# Patient Record
Sex: FEMALE | Race: WHITE
[De-identification: names, ages, dates, MRNs, and addresses within clinical notes are randomized per-mention and may not be internally consistent; named-entity substitution may affect disease eponyms.]

---

## 2017-01-03 ENCOUNTER — HOSPITAL ENCOUNTER (OUTPATIENT)
Dept: HOSPITAL 45 - C.MAMM | Age: 67
Discharge: HOME | End: 2017-01-03
Attending: OBSTETRICS & GYNECOLOGY
Payer: COMMERCIAL

## 2017-01-03 DIAGNOSIS — Z12.31: Primary | ICD-10-CM

## 2017-01-03 NOTE — MAMMOGRAPHY REPORT
BILATERAL DIGITAL SCREENING MAMMOGRAM WITH CAD: 1/3/2017

CLINICAL HISTORY: Routine screening examination.  





TECHNIQUE: Bilateral CC and MLO views were obtained.  Current study was also evaluated with a Comput
er Aided Detection (CAD) system.  



COMPARISON: Comparison is made to exams dated:  11/19/2015 mammogram, 11/18/2014 mammogram, 11/10/20
10 mammogram, 11/14/2013 mammogram, 11/13/2012 mammogram, and 11/11/2011 mammogram - [facility].   



BREAST COMPOSITION:  The tissue of both breasts is heterogeneously dense, which may obscure small ma
sses.  



FINDINGS: There is a stable 8 mm mass in the medial left breast, unchanged in size dating back to at
 least 09/04/2007, and therefore likely benign.  There are scattered stable benign-appearing calcifi
cations bilaterally.  No new suspicious mass, architectural distortion or cluster of microcalcificat
ions is seen.  



IMPRESSION:  ACR BI-RADS CATEGORY 1: NEGATIVE

There is no mammographic evidence of malignancy. A 1 year screening mammogram is recommended.  The p
atient will receive written notification of the results.  





Approximately 10% of breast cancers are not detected with mammography. A negative mammographic repor
t should not delay biopsy if a clinically suggestive mass is present.



Francoise Orta M.D.          

ay/:1/3/2017 14:29:42  



Imaging Technologist: Viviana DURÁN(ALVARO)(ELLIOTT), [facility]

letter sent: Normal 1/2  

BI-RADS Code: ACR BI-RADS Category 1: Negative

## 2017-02-20 ENCOUNTER — HOSPITAL ENCOUNTER (OUTPATIENT)
Dept: HOSPITAL 45 - C.PAPS | Age: 67
Discharge: HOME | End: 2017-02-20
Attending: OBSTETRICS & GYNECOLOGY
Payer: COMMERCIAL

## 2017-02-20 DIAGNOSIS — Z01.419: Primary | ICD-10-CM

## 2018-01-04 ENCOUNTER — HOSPITAL ENCOUNTER (OUTPATIENT)
Dept: HOSPITAL 45 - C.MAMM | Age: 68
Discharge: HOME | End: 2018-01-04
Attending: OBSTETRICS & GYNECOLOGY
Payer: COMMERCIAL

## 2018-01-04 DIAGNOSIS — Z12.31: Primary | ICD-10-CM

## 2018-01-04 NOTE — MAMMOGRAPHY REPORT
BILATERAL DIGITAL SCREENING MAMMOGRAM TOMOSYNTHESIS WITH CAD: 1/4/2018

CLINICAL HISTORY: Routine screening.  Patient has no complaints.  





TECHNIQUE:  Breast tomosynthesis in addition to standard 2D mammography was performed. Current study 
was also evaluated with a Computer Aided Detection (CAD) system.  



COMPARISON: Comparison is made to exams dated:  1/3/2017 mammogram, 11/19/2015 mammogram, 11/18/2014 
mammogram, 11/14/2013 mammogram, 11/13/2012 mammogram, and 11/11/2011 mammogram - [facility].   



BREAST COMPOSITION:  The tissue of both breasts is heterogeneously dense, which may obscure small mas
ses.  



FINDINGS:  The parenchymal pattern is similar to prior mammograms.  There are scattered and grouped b
enign-appearing round and punctate microcalcifications and mild vascular calcifications in the breast
s.  Stable nodular asymmetry in the medial left breast. No new suspicious mass, architectural distort
ion or cluster of microcalcifications is seen.  



IMPRESSION:  ACR BI-RADS CATEGORY 2: BENIGN

There is no mammographic evidence of malignancy. A 1 year screening mammogram is recommended.  The pa
tient will receive written notification of the results.  





Approximately 10% of breast cancers are not detected with mammography. A negative mammographic report
 should not delay biopsy if a clinically suggestive mass is present.



Francoise Orta M.D.          

ay/:1/4/2018 10:52:20  



Imaging Technologist: Liset DURÁN(ALVARO)(ELLIOTT)([place]), [facility]

letter sent: Normal 1/2  

BI-RADS Code: ACR BI-RADS Category 2: Benign

## 2018-02-27 ENCOUNTER — HOSPITAL ENCOUNTER (OUTPATIENT)
Dept: HOSPITAL 45 - C.CTS | Age: 68
Discharge: HOME | End: 2018-02-27
Attending: INTERNAL MEDICINE
Payer: COMMERCIAL

## 2018-02-27 DIAGNOSIS — G45.9: Primary | ICD-10-CM

## 2018-02-27 NOTE — DIAGNOSTIC IMAGING REPORT
CT OF THE HEAD WITHOUT CONTRAST



CLINICAL HISTORY: Transient cerebral ischemia.    



COMPARISON STUDY:  No previous studies for comparison. 



CT DOSE: 537.48 mGy.cm



TECHNIQUE: Helical axial images of the head were obtained without IV contrast.

Automated exposure control was utilized for the study.  A dose lowering

technique was utilized adhering to the principles of ALARA.





FINDINGS: No acute intracranial hemorrhage, midline shift or mass effect is

present. Brain volume is normal for age. Ventricular system is normal. Basilar

cisterns are patent. There are no extra-axial collections. Gray-white

differentiation is maintained. There are no findings to suggest acute dural

sinus thrombosis or acute territorial infarct. There are no significant

calvarial abnormalities. Visualized portions of the sinuses and mastoid air

cells are clear.



IMPRESSION:  No acute intracranial findings. 







Electronically signed by:  Murphy Esteban M.D.

2/27/2018 4:04 PM



Dictated Date/Time:  2/27/2018 4:00 PM